# Patient Record
Sex: FEMALE | Race: WHITE | Employment: UNEMPLOYED | URBAN - METROPOLITAN AREA
[De-identification: names, ages, dates, MRNs, and addresses within clinical notes are randomized per-mention and may not be internally consistent; named-entity substitution may affect disease eponyms.]

---

## 2018-07-21 ENCOUNTER — OFFICE VISIT (OUTPATIENT)
Dept: OBGYN CLINIC | Facility: CLINIC | Age: 23
End: 2018-07-21

## 2018-07-21 VITALS
DIASTOLIC BLOOD PRESSURE: 60 MMHG | BODY MASS INDEX: 22.51 KG/M2 | SYSTOLIC BLOOD PRESSURE: 110 MMHG | HEART RATE: 88 BPM | WEIGHT: 152 LBS | RESPIRATION RATE: 16 BRPM | HEIGHT: 69 IN

## 2018-07-21 DIAGNOSIS — N76.0 VAGINITIS AND VULVOVAGINITIS: Primary | ICD-10-CM

## 2018-07-21 DIAGNOSIS — Z12.4 CERVICAL CANCER SCREENING: ICD-10-CM

## 2018-07-21 PROBLEM — D06.9 CIN III WITH SEVERE DYSPLASIA: Status: ACTIVE | Noted: 2018-07-21

## 2018-07-21 PROCEDURE — 88175 CYTOPATH C/V AUTO FLUID REDO: CPT | Performed by: OBSTETRICS & GYNECOLOGY

## 2018-07-21 PROCEDURE — 87510 GARDNER VAG DNA DIR PROBE: CPT | Performed by: OBSTETRICS & GYNECOLOGY

## 2018-07-21 PROCEDURE — 87480 CANDIDA DNA DIR PROBE: CPT | Performed by: OBSTETRICS & GYNECOLOGY

## 2018-07-21 PROCEDURE — 87660 TRICHOMONAS VAGIN DIR PROBE: CPT | Performed by: OBSTETRICS & GYNECOLOGY

## 2018-07-21 PROCEDURE — 99203 OFFICE O/P NEW LOW 30 MIN: CPT | Performed by: OBSTETRICS & GYNECOLOGY

## 2018-07-21 NOTE — PROGRESS NOTES
Chris Sheehan is a 21year old female [id number] Patient's last menstrual period was 07/09/2018 (approximate). Patient presents with:  Pap: only. new pt. Pt had LEEP 11/2017. f.u  . Patient had LEEP 12/17 would like to follow up with pap.  Also c/o back pain. Skin/Breast:  Denies any breast pain, lumps, or discharge. Neurological:  denies headaches, extremity weakness or numbness. Psychiatric: denies depression or anxiety. Endocrine:   denies excessive thirst or urination.   Heme/Lymph:  denies h

## 2018-07-23 NOTE — PROGRESS NOTES
Patient aware of lab result and new prescription. Patient asked if I could speak to her  due to language barrier. With patient permission I gave results to her . Both verbalize understanding.

## 2018-07-24 ENCOUNTER — TELEPHONE (OUTPATIENT)
Dept: OBGYN CLINIC | Facility: CLINIC | Age: 23
End: 2018-07-24

## 2018-07-24 NOTE — TELEPHONE ENCOUNTER
Patient concern with using Metrogel and breastfeeding. Per Dr. Macie Romero and lactation consultant Metrogel ok during breastfeeding. Patient aware and verbalizes understanding.

## 2018-07-24 NOTE — TELEPHONE ENCOUNTER
Per pt she was given a gel to treat her condition, however she is breastfeeding and has some questions about the medication she was given. Please advise and call pt.  Thanks

## 2018-11-06 ENCOUNTER — OFFICE VISIT (OUTPATIENT)
Dept: OBGYN CLINIC | Facility: CLINIC | Age: 23
End: 2018-11-06

## 2018-11-06 VITALS
BODY MASS INDEX: 21.18 KG/M2 | DIASTOLIC BLOOD PRESSURE: 60 MMHG | RESPIRATION RATE: 16 BRPM | HEART RATE: 72 BPM | SYSTOLIC BLOOD PRESSURE: 128 MMHG | WEIGHT: 143 LBS | HEIGHT: 69 IN

## 2018-11-06 PROCEDURE — 99212 OFFICE O/P EST SF 10 MIN: CPT | Performed by: OBSTETRICS & GYNECOLOGY

## 2018-11-06 NOTE — PROGRESS NOTES
Patient is breastfeeding and noted lump in left breast after sleeping on her stomach    /60 (BP Location: Left arm, Patient Position: Sitting, Cuff Size: adult)   Pulse 72   Resp 16   Ht 69\"   Wt 143 lb   LMP 10/23/2018 (Approximate)   Breastfeeding

## 2020-02-14 ENCOUNTER — OFFICE VISIT (OUTPATIENT)
Dept: INTERNAL MEDICINE CLINIC | Facility: CLINIC | Age: 25
End: 2020-02-14

## 2020-02-14 VITALS
TEMPERATURE: 98 F | OXYGEN SATURATION: 98 % | DIASTOLIC BLOOD PRESSURE: 62 MMHG | RESPIRATION RATE: 14 BRPM | HEIGHT: 69 IN | WEIGHT: 161.19 LBS | HEART RATE: 78 BPM | BODY MASS INDEX: 23.87 KG/M2 | SYSTOLIC BLOOD PRESSURE: 100 MMHG

## 2020-02-14 DIAGNOSIS — Z13.1 SCREENING FOR DIABETES MELLITUS: ICD-10-CM

## 2020-02-14 DIAGNOSIS — G89.29 CHRONIC LEFT-SIDED LOW BACK PAIN WITHOUT SCIATICA: ICD-10-CM

## 2020-02-14 DIAGNOSIS — D06.9 CIN III WITH SEVERE DYSPLASIA: ICD-10-CM

## 2020-02-14 DIAGNOSIS — Z13.29 SCREENING FOR THYROID DISORDER: ICD-10-CM

## 2020-02-14 DIAGNOSIS — N63.10 BREAST MASS, RIGHT: Primary | ICD-10-CM

## 2020-02-14 DIAGNOSIS — Z13.220 SCREENING FOR LIPOID DISORDERS: ICD-10-CM

## 2020-02-14 DIAGNOSIS — Z13.0 SCREENING FOR IRON DEFICIENCY ANEMIA: ICD-10-CM

## 2020-02-14 DIAGNOSIS — Z13.228 SCREENING FOR METABOLIC DISORDER: ICD-10-CM

## 2020-02-14 DIAGNOSIS — M54.50 CHRONIC LEFT-SIDED LOW BACK PAIN WITHOUT SCIATICA: ICD-10-CM

## 2020-02-14 PROCEDURE — 99204 OFFICE O/P NEW MOD 45 MIN: CPT | Performed by: STUDENT IN AN ORGANIZED HEALTH CARE EDUCATION/TRAINING PROGRAM

## 2020-02-14 NOTE — PATIENT INSTRUCTIONS
qasim  Back Care Tips    Caring for your back  These are things you can do to prevent a recurrence of acute back pain and to reduce symptoms from chronic back pain:  · Stay at a healthy weight.  If you are overweight, losing weight will help most types of latricia thinners. · Be careful if you are given prescription pain medicines, opioids, or medicine for muscle spasm. They can cause drowsiness, and affect your coordination, reflexes, and judgment.  Don't drive or operate heavy machinery while taking these types of your back, put pillows under your knees to support your legs in a slightly flexed position. Use a firm mattress. If your mattress sags, replace it, or use a 1/2-inch plywood board under the mattress to add support.   Follow-up care  Follow up with your heal minutes, several times a day. To make a cold pack, put ice cubes in a plastic bag that seals at the top. · After the first few days, try heat for 15 minutes at a time to ease pain. Never sleep on a heating pad.   · Over-the-counter medicine can help contro

## 2020-02-14 NOTE — PROGRESS NOTES
HPI:    Patient ID: Tiffany Piper is a 25year old female. This is a new patient to me. Patient's medications, allergies, past medical, surgical, social and family histories were reviewed and updated as appropriate.   Patient reports she is a v Neurological: Negative. Negative for weakness, numbness and paresthesias. Psychiatric/Behavioral: Negative. No current outpatient medications on file.      Allergies:No Known Allergies   PHYSICAL EXAM:   Physical Exam   Constitutional: She is Patient would like to establish with GYN. Has h/o [place]. Referral given.     Screening for diabetes mellitus  Screening for lipoid disorders  Screening for thyroid disorder  Screening for iron deficiency anemia  Screening for metabolic disorder  Weston iii with

## 2020-02-24 ENCOUNTER — LAB ENCOUNTER (OUTPATIENT)
Dept: LAB | Age: 25
End: 2020-02-24
Attending: STUDENT IN AN ORGANIZED HEALTH CARE EDUCATION/TRAINING PROGRAM
Payer: COMMERCIAL

## 2020-02-24 DIAGNOSIS — Z13.228 SCREENING FOR METABOLIC DISORDER: ICD-10-CM

## 2020-02-24 DIAGNOSIS — Z13.1 SCREENING FOR DIABETES MELLITUS: ICD-10-CM

## 2020-02-24 DIAGNOSIS — Z13.29 SCREENING FOR THYROID DISORDER: ICD-10-CM

## 2020-02-24 DIAGNOSIS — Z13.220 SCREENING FOR LIPOID DISORDERS: ICD-10-CM

## 2020-02-24 DIAGNOSIS — Z13.0 SCREENING FOR IRON DEFICIENCY ANEMIA: ICD-10-CM

## 2020-02-24 LAB
ALBUMIN SERPL-MCNC: 4.2 G/DL (ref 3.4–5)
ALBUMIN/GLOB SERPL: 1.1 {RATIO} (ref 1–2)
ALP LIVER SERPL-CCNC: 70 U/L (ref 37–98)
ALT SERPL-CCNC: 21 U/L (ref 13–56)
ANION GAP SERPL CALC-SCNC: 5 MMOL/L (ref 0–18)
AST SERPL-CCNC: 12 U/L (ref 15–37)
BASOPHILS # BLD AUTO: 0.07 X10(3) UL (ref 0–0.2)
BASOPHILS NFR BLD AUTO: 0.9 %
BILIRUB SERPL-MCNC: 0.4 MG/DL (ref 0.1–2)
BUN BLD-MCNC: 8 MG/DL (ref 7–18)
BUN/CREAT SERPL: 11.1 (ref 10–20)
CALCIUM BLD-MCNC: 8.7 MG/DL (ref 8.5–10.1)
CHLORIDE SERPL-SCNC: 106 MMOL/L (ref 98–112)
CHOLEST SMN-MCNC: 178 MG/DL (ref ?–200)
CO2 SERPL-SCNC: 24 MMOL/L (ref 21–32)
CREAT BLD-MCNC: 0.72 MG/DL (ref 0.55–1.02)
DEPRECATED RDW RBC AUTO: 41.5 FL (ref 35.1–46.3)
EOSINOPHIL # BLD AUTO: 0.13 X10(3) UL (ref 0–0.7)
EOSINOPHIL NFR BLD AUTO: 1.6 %
ERYTHROCYTE [DISTWIDTH] IN BLOOD BY AUTOMATED COUNT: 12.5 % (ref 11–15)
EST. AVERAGE GLUCOSE BLD GHB EST-MCNC: 108 MG/DL (ref 68–126)
GLOBULIN PLAS-MCNC: 3.7 G/DL (ref 2.8–4.4)
GLUCOSE BLD-MCNC: 90 MG/DL (ref 70–99)
HBA1C MFR BLD HPLC: 5.4 % (ref ?–5.7)
HCT VFR BLD AUTO: 41.5 % (ref 35–48)
HDLC SERPL-MCNC: 61 MG/DL (ref 40–59)
HGB BLD-MCNC: 13.9 G/DL (ref 12–16)
IMM GRANULOCYTES # BLD AUTO: 0.01 X10(3) UL (ref 0–1)
IMM GRANULOCYTES NFR BLD: 0.1 %
LDLC SERPL CALC-MCNC: 93 MG/DL (ref ?–100)
LYMPHOCYTES # BLD AUTO: 2.3 X10(3) UL (ref 1–4)
LYMPHOCYTES NFR BLD AUTO: 28.8 %
M PROTEIN MFR SERPL ELPH: 7.9 G/DL (ref 6.4–8.2)
MCH RBC QN AUTO: 30.7 PG (ref 26–34)
MCHC RBC AUTO-ENTMCNC: 33.5 G/DL (ref 31–37)
MCV RBC AUTO: 91.6 FL (ref 80–100)
MONOCYTES # BLD AUTO: 0.76 X10(3) UL (ref 0.1–1)
MONOCYTES NFR BLD AUTO: 9.5 %
NEUTROPHILS # BLD AUTO: 4.71 X10 (3) UL (ref 1.5–7.7)
NEUTROPHILS # BLD AUTO: 4.71 X10(3) UL (ref 1.5–7.7)
NEUTROPHILS NFR BLD AUTO: 59.1 %
NONHDLC SERPL-MCNC: 117 MG/DL (ref ?–130)
OSMOLALITY SERPL CALC.SUM OF ELEC: 278 MOSM/KG (ref 275–295)
PATIENT FASTING Y/N/NP: YES
PATIENT FASTING Y/N/NP: YES
PLATELET # BLD AUTO: 266 10(3)UL (ref 150–450)
POTASSIUM SERPL-SCNC: 3.6 MMOL/L (ref 3.5–5.1)
RBC # BLD AUTO: 4.53 X10(6)UL (ref 3.8–5.3)
SODIUM SERPL-SCNC: 135 MMOL/L (ref 136–145)
TRIGL SERPL-MCNC: 119 MG/DL (ref 30–149)
TSI SER-ACNC: 1.35 MIU/ML (ref 0.36–3.74)
VLDLC SERPL CALC-MCNC: 24 MG/DL (ref 0–30)
WBC # BLD AUTO: 8 X10(3) UL (ref 4–11)

## 2020-02-24 PROCEDURE — 84443 ASSAY THYROID STIM HORMONE: CPT

## 2020-02-24 PROCEDURE — 80053 COMPREHEN METABOLIC PANEL: CPT

## 2020-02-24 PROCEDURE — 83036 HEMOGLOBIN GLYCOSYLATED A1C: CPT

## 2020-02-24 PROCEDURE — 85025 COMPLETE CBC W/AUTO DIFF WBC: CPT

## 2020-02-24 PROCEDURE — 80061 LIPID PANEL: CPT

## 2020-02-27 ENCOUNTER — TELEPHONE (OUTPATIENT)
Dept: INTERNAL MEDICINE CLINIC | Facility: CLINIC | Age: 25
End: 2020-02-27

## 2020-02-27 NOTE — TELEPHONE ENCOUNTER
Left detailed message per HIPAA. Made aware results stable. Normal letter was sent. Unable to change PCP to Dr. Perry Mustafa. FYI to [place].

## 2020-03-09 ENCOUNTER — HOSPITAL ENCOUNTER (OUTPATIENT)
Dept: ULTRASOUND IMAGING | Age: 25
Discharge: HOME | End: 2020-03-09
Attending: STUDENT IN AN ORGANIZED HEALTH CARE EDUCATION/TRAINING PROGRAM
Payer: COMMERCIAL

## 2020-03-09 DIAGNOSIS — N63.10 BREAST MASS, RIGHT: ICD-10-CM

## 2020-03-09 PROCEDURE — 76642 ULTRASOUND BREAST LIMITED: CPT | Performed by: STUDENT IN AN ORGANIZED HEALTH CARE EDUCATION/TRAINING PROGRAM

## 2020-06-03 ENCOUNTER — OFFICE VISIT (OUTPATIENT)
Dept: OBGYN CLINIC | Facility: CLINIC | Age: 25
End: 2020-06-03

## 2020-06-03 VITALS — WEIGHT: 162 LBS | SYSTOLIC BLOOD PRESSURE: 118 MMHG | DIASTOLIC BLOOD PRESSURE: 80 MMHG | BODY MASS INDEX: 24 KG/M2

## 2020-06-03 DIAGNOSIS — R10.2 PELVIC PRESSURE IN FEMALE: Primary | ICD-10-CM

## 2020-06-03 DIAGNOSIS — N89.8 VAGINAL DISCHARGE: ICD-10-CM

## 2020-06-03 DIAGNOSIS — N72 CERVICITIS: ICD-10-CM

## 2020-06-03 PROCEDURE — 87510 GARDNER VAG DNA DIR PROBE: CPT | Performed by: OBSTETRICS & GYNECOLOGY

## 2020-06-03 PROCEDURE — 87491 CHLMYD TRACH DNA AMP PROBE: CPT | Performed by: OBSTETRICS & GYNECOLOGY

## 2020-06-03 PROCEDURE — 87591 N.GONORRHOEAE DNA AMP PROB: CPT | Performed by: OBSTETRICS & GYNECOLOGY

## 2020-06-03 PROCEDURE — 87480 CANDIDA DNA DIR PROBE: CPT | Performed by: OBSTETRICS & GYNECOLOGY

## 2020-06-03 PROCEDURE — 87660 TRICHOMONAS VAGIN DIR PROBE: CPT | Performed by: OBSTETRICS & GYNECOLOGY

## 2020-06-03 PROCEDURE — 99214 OFFICE O/P EST MOD 30 MIN: CPT | Performed by: OBSTETRICS & GYNECOLOGY

## 2020-06-03 RX ORDER — AZITHROMYCIN 500 MG/1
1000 TABLET, FILM COATED ORAL ONCE
Qty: 2 TABLET | Refills: 0 | Status: SHIPPED | OUTPATIENT
Start: 2020-06-03 | End: 2020-06-03

## 2020-06-03 NOTE — PROGRESS NOTES
OB/GYN H&P       CC: Patient presents with:  Vaginal Problem: Pt c/o vaginal pain when sitting or wake up since 1 week ago. Pt also c/o low sex drive, Pt states partener states he feels like rough  with intercouse.        HPI: Lisa Scott is a 2 Types: Cigarettes      Smokeless tobacco: Never Used      Tobacco comment: 5 cig a day    Alcohol use: No    Drug use: No      ROS:   A comprehensive 10 point review of systems was completed. Pertinent positives and negatives noted in the the HPI.     Anola Barthel

## 2020-06-04 ENCOUNTER — TELEPHONE (OUTPATIENT)
Dept: OBGYN CLINIC | Facility: CLINIC | Age: 25
End: 2020-06-04

## 2020-06-04 RX ORDER — AZITHROMYCIN 500 MG/1
1000 TABLET, FILM COATED ORAL ONCE
Qty: 2 TABLET | Refills: 0 | Status: SHIPPED | OUTPATIENT
Start: 2020-06-04 | End: 2020-06-04

## 2020-06-04 NOTE — TELEPHONE ENCOUNTER
Fax received from [facility] requesting clarification on Azithromycin order. Per OV notes, pt to take 1 gm Azithromycin for cervicitis. Order was placed with instructions to take 1gm once, but also 2 tabs today and then one tab daily for 4 days.     New scri

## 2020-06-05 NOTE — PROGRESS NOTES
Pt and  notified of results; explained in depth that pt should take Azithromycin 1 gm as ordered for cervicitis, even though culture was negative. Pt and  verbalize understanding.

## 2020-09-04 ENCOUNTER — TELEPHONE (OUTPATIENT)
Dept: INTERNAL MEDICINE CLINIC | Facility: CLINIC | Age: 25
End: 2020-09-04
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
## 2020-09-04 NOTE — TELEPHONE ENCOUNTER
Virtual/Telephone Consent    Mague Sharpe verbally {consents to a Virtual/Telephone Check-In service on 9/9/20  Patient understands and accepts financial responsibility for any deductible, co-insurance and/or co-pays associated with this service

## 2020-09-09 ENCOUNTER — VIRTUAL PHONE E/M (OUTPATIENT)
Dept: INTERNAL MEDICINE CLINIC | Facility: CLINIC | Age: 25
End: 2020-09-09

## 2020-09-09 DIAGNOSIS — Z71.89 COUNSELED ABOUT COVID-19 VIRUS INFECTION: Primary | ICD-10-CM

## 2020-09-09 PROCEDURE — 99213 OFFICE O/P EST LOW 20 MIN: CPT | Performed by: STUDENT IN AN ORGANIZED HEALTH CARE EDUCATION/TRAINING PROGRAM

## 2020-09-09 NOTE — PROGRESS NOTES
Virtual Telephone Check-In    Apolonia Mijares verbally consents to a Virtual/Telephone Check-In visit on 09/09/20. Patient has been referred to the [facility] website at [email or web address] to review the yearly Consent to Treat document.     Patient (Approximate)    Wt Readings from Last 6 Encounters:  06/03/20 : 162 lb (73.5 kg)  02/14/20 : 161 lb 3.2 oz (73.1 kg)  11/06/18 : 143 lb (64.9 kg)  07/21/18 : 152 lb (68.9 kg)    There is no height or weight on file to calculate BMI.      Speaking in full s encounter.       Sherry Price MD  9/9/2020  1:39 PM

## (undated) NOTE — LETTER
February 25, 2020     Faye Eliana  [address]      Dear Jennell Hashimoto :    Below are the results from your recent visit:    Your tests are stable.  The values for your labs are in the middle column, and we've RBC 4.53 3.80 - 5.30 x10(6)uL    HGB 13.9 12.0 - 16.0 g/dL    HCT 41.5 35.0 - 48.0 %    .0 150.0 - 450.0 10(3)uL    MCV 91.6 80.0 - 100.0 fL    MCH 30.7 26.0 - 34.0 pg    MCHC 33.5 31.0 - 37.0 g/dL    RDW 12.5 11.0 - 15.0 %    RDW-SD 41.5 35.1 - 46

## (undated) NOTE — MR AVS SNAPSHOT
After Visit Summary   6/3/2020    Siddharth Cruz    MRN: [id number]           Visit Information     Date & Time  6/3/2020 12:30 PM Provider  Semaj Villafuerte, [address] Dept[address]  Phone  [phone]. Enter your mydala Activation Code exactly as it appears below. You will not need to use this code after you have completed the sign-up process. If you do not sign up before the expiration date, you must request a new code. mydala Activation Code:  Romna Hudson non-emergency, consider your options before heading to an ER. VIDEO VISITS  Visit face-to-face with a [facility] physician or   [facility] using your mobile device or computer   using [employer].    e-VISITS  Communicate with a [facility] Physician or [facility] online.  The physician sj